# Patient Record
Sex: FEMALE | Race: WHITE | NOT HISPANIC OR LATINO | Employment: PART TIME | URBAN - METROPOLITAN AREA
[De-identification: names, ages, dates, MRNs, and addresses within clinical notes are randomized per-mention and may not be internally consistent; named-entity substitution may affect disease eponyms.]

---

## 2023-09-30 ENCOUNTER — APPOINTMENT (OUTPATIENT)
Dept: GENERAL RADIOLOGY | Facility: CLINIC | Age: 38
End: 2023-09-30
Attending: FAMILY MEDICINE

## 2023-09-30 ENCOUNTER — HOSPITAL ENCOUNTER (EMERGENCY)
Facility: CLINIC | Age: 38
Discharge: HOME | End: 2023-09-30
Attending: FAMILY MEDICINE | Admitting: FAMILY MEDICINE

## 2023-09-30 VITALS
TEMPERATURE: 98.3 F | HEIGHT: 66 IN | BODY MASS INDEX: 28.12 KG/M2 | OXYGEN SATURATION: 99 % | RESPIRATION RATE: 16 BRPM | DIASTOLIC BLOOD PRESSURE: 104 MMHG | WEIGHT: 175 LBS | SYSTOLIC BLOOD PRESSURE: 128 MMHG | HEART RATE: 91 BPM

## 2023-09-30 DIAGNOSIS — G89.29 CHRONIC PAIN IN RIGHT FOOT: ICD-10-CM

## 2023-09-30 DIAGNOSIS — M79.671 CHRONIC PAIN IN RIGHT FOOT: ICD-10-CM

## 2023-09-30 PROCEDURE — 99283 EMERGENCY DEPT VISIT LOW MDM: CPT

## 2023-09-30 PROCEDURE — 73630 X-RAY EXAM OF FOOT: CPT | Mod: RT

## 2023-09-30 PROCEDURE — 99283 EMERGENCY DEPT VISIT LOW MDM: CPT | Performed by: FAMILY MEDICINE

## 2023-09-30 RX ORDER — OXYCODONE HYDROCHLORIDE 5 MG/1
5 TABLET ORAL EVERY 6 HOURS PRN
Qty: 6 TABLET | Refills: 0 | Status: SHIPPED | OUTPATIENT
Start: 2023-09-30

## 2023-10-01 NOTE — ED TRIAGE NOTES
Comes in with right foot pain. Hx of multiple fx to right foot, and since then has had pain on and off, usually the pain is very brief, the pain started yesterday about 1500 and has not subsided.      Triage Assessment       Row Name 09/30/23 2112       Triage Assessment (Adult)    Airway WDL WDL       Respiratory WDL    Respiratory WDL WDL       Skin Circulation/Temperature WDL    Skin Circulation/Temperature WDL WDL       Cardiac WDL    Cardiac WDL WDL       Peripheral/Neurovascular WDL    Peripheral Neurovascular WDL WDL       Cognitive/Neuro/Behavioral WDL    Cognitive/Neuro/Behavioral WDL WDL

## 2023-10-01 NOTE — DISCHARGE INSTRUCTIONS
1.  Continue to use the Tylenol during the day, use the oxycodone at night for any breakthrough pain.  I would avoid wearing tennis shoes for the time being.  Please follow-up with podiatry to discuss further findings.

## 2023-10-02 NOTE — ED PROVIDER NOTES
"  History     Chief Complaint   Patient presents with    Foot Pain     HPI  Dodie Colin is a 38 year old female who presents with worsening foot pain.  Patient has chronic issues with her foot.  Patient states that it started bothering her yesterday when she was wearing some tennis shoes.  Denies any obvious trauma.  Patient has been able to walk on it but she feels like there is a bump on top of her foot that seems little worse.    Allergies:  Allergies   Allergen Reactions    Cefuroxime Anaphylaxis     12/22 per pt    Penicillins Anaphylaxis and Hives    Sulfa Antibiotics Anaphylaxis       Problem List:    There are no problems to display for this patient.       Past Medical History:    No past medical history on file.    Past Surgical History:    No past surgical history on file.    Family History:    No family history on file.    Social History:  Marital Status:  Single [1]        Medications:    oxyCODONE (ROXICODONE) 5 MG tablet          Review of Systems   All other systems reviewed and are negative.      Physical Exam   BP: (!) 128/104  Pulse: 91  Temp: 98.3  F (36.8  C)  Resp: 18  Height: 167.6 cm (5' 6\")  Weight: 79.4 kg (175 lb)  SpO2: 99 %      Physical Exam  Vitals and nursing note reviewed.   Constitutional:       General: She is not in acute distress.     Appearance: Normal appearance. She is not ill-appearing.   Musculoskeletal:         General: Tenderness (Tenderness over the midfoot area on the right foot.  No swelling or edema noted) present.   Skin:     Capillary Refill: Capillary refill takes less than 2 seconds.   Neurological:      Mental Status: She is alert.         ED Course                 Procedures        Results for orders placed or performed during the hospital encounter of 09/30/23   XR Foot Right G/E 3 Views     Status: None    Narrative    EXAM: XR FOOT RIGHT G/E 3 VIEWS  LOCATION: [facility]  DATE: 9/30/2023    INDICATION: Foot pain after " trauma.  COMPARISON: None.      Impression    IMPRESSION: Normal joint alignment. No acute fracture identified. There is a single tiny 1 mm focus of ossification adjacent to the base of the first metatarsal favored, which is favored to be a chronic finding unrelated to acute trauma; however, if   there is specific concern for midfoot fractures due to injury mechanism and pain and tenderness in this region, CT could more sensitively evaluate. Orthopedic fixation screw, is well seated without complication.         Medications - No data to display    X-ray was mostly unremarkable, there is ossification area that was shown up on x-ray, she is tender in this area, I think this is related more to her chronic foot pain and old fractures.  We will go ahead and discharge the patient at this time.  Patient was given a few pain medications, patient was told to not wear tennis shoes.  Patient was referred to podiatry.    Assessments & Plan (with Medical Decision Making)  Chronic right foot pain     I have reviewed the nursing notes.    I have reviewed the findings, diagnosis, plan and need for follow up with the patient.      Discharge Medication List as of 9/30/2023 10:16 PM        START taking these medications    Details   oxyCODONE (ROXICODONE) 5 MG tablet Take 1 tablet (5 mg) by mouth every 6 hours as needed for severe pain, Disp-6 tablet, R-0, [facility]             Final diagnoses:   Chronic pain in right foot       9/30/2023   [facility] EMERGENCY DEPT       Parvez, Freddy Max MD  10/02/23 0714

## 2024-10-28 ENCOUNTER — HOSPITAL ENCOUNTER (EMERGENCY)
Facility: CLINIC | Age: 39
Discharge: HOME | End: 2024-10-28
Attending: EMERGENCY MEDICINE | Admitting: EMERGENCY MEDICINE

## 2024-10-28 VITALS
DIASTOLIC BLOOD PRESSURE: 84 MMHG | BODY MASS INDEX: 28.93 KG/M2 | OXYGEN SATURATION: 100 % | WEIGHT: 180 LBS | SYSTOLIC BLOOD PRESSURE: 123 MMHG | TEMPERATURE: 97.4 F | HEIGHT: 66 IN | RESPIRATION RATE: 22 BRPM | HEART RATE: 82 BPM

## 2024-10-28 DIAGNOSIS — R06.02 SHORTNESS OF BREATH: ICD-10-CM

## 2024-10-28 PROCEDURE — 99283 EMERGENCY DEPT VISIT LOW MDM: CPT | Performed by: EMERGENCY MEDICINE

## 2024-10-28 PROCEDURE — 93005 ELECTROCARDIOGRAM TRACING: CPT | Performed by: EMERGENCY MEDICINE

## 2024-10-28 PROCEDURE — 99284 EMERGENCY DEPT VISIT MOD MDM: CPT | Mod: 25 | Performed by: EMERGENCY MEDICINE

## 2024-10-28 ASSESSMENT — COLUMBIA-SUICIDE SEVERITY RATING SCALE - C-SSRS
1. IN THE PAST MONTH, HAVE YOU WISHED YOU WERE DEAD OR WISHED YOU COULD GO TO SLEEP AND NOT WAKE UP?: NO
6. HAVE YOU EVER DONE ANYTHING, STARTED TO DO ANYTHING, OR PREPARED TO DO ANYTHING TO END YOUR LIFE?: NO
2. HAVE YOU ACTUALLY HAD ANY THOUGHTS OF KILLING YOURSELF IN THE PAST MONTH?: NO

## 2024-10-28 ASSESSMENT — ACTIVITIES OF DAILY LIVING (ADL): ADLS_ACUITY_SCORE: 0

## 2024-10-28 NOTE — ED NOTES
No forms was signed because nurse went back in and patient left. Tech notified nurse about the patient.

## 2024-10-28 NOTE — ED PROVIDER NOTES
"  History     Chief Complaint   Patient presents with    Shortness of Breath    Dizziness     HPI  Dodie Colin is a 39 year old female who presents to the emergency department via private vehicle secondary to shortness of breath and lightheadedness which started this morning.  She was talking on the phone this morning when she abruptly started getting short of breath.  She is having hot and cold flashes and states that she is passed out twice.  She did not fall to the floor.  She was hyperventilating on the way here and  looked over and she was passed out with her head to the side.  He checked her pulse on her neck and she had a pulse.  She does not have any chest pain.  She has a raspy voice but says that she gets that easily and that she was doing some yelling last night and that is the cause of that.  She has otherwise been well has not had any infectious symptoms such as fever chills nausea vomiting diarrhea coughing, fever, body aches.    Allergies:  Allergies   Allergen Reactions    Cefuroxime Anaphylaxis     12/22 per pt    Penicillins Anaphylaxis and Hives    Sulfa Antibiotics Anaphylaxis       Problem List:    There are no active problems to display for this patient.       Past Medical History:    No past medical history on file.    Past Surgical History:    No past surgical history on file.    Family History:    No family history on file.    Social History:  Marital Status:  Single [1]        Medications:    oxyCODONE (ROXICODONE) 5 MG tablet          Review of Systems   All other systems reviewed and are negative.      Physical Exam   BP: 123/84  Pulse: 82  Temp: 97.4  F (36.3  C)  Resp: 22  Height: 167.6 cm (5' 6\")  Weight: 81.6 kg (180 lb)  SpO2: 100 %      Physical Exam  Vitals and nursing note reviewed.   Constitutional:       General: She is not in acute distress.     Appearance: Normal appearance. She is well-developed.   HENT:      Head: Normocephalic and atraumatic.      Mouth/Throat: "      Mouth: Mucous membranes are moist.      Pharynx: No pharyngeal swelling or oropharyngeal exudate.   Eyes:      General: No scleral icterus.     Extraocular Movements: Extraocular movements intact.      Conjunctiva/sclera: Conjunctivae normal.   Cardiovascular:      Rate and Rhythm: Normal rate and regular rhythm.   Pulmonary:      Effort: Pulmonary effort is normal. Tachypnea present. No respiratory distress.      Breath sounds: Normal breath sounds. No stridor. No decreased breath sounds, wheezing, rhonchi or rales.   Abdominal:      General: Abdomen is flat.   Musculoskeletal:         General: Normal range of motion.      Cervical back: Normal range of motion and neck supple.      Right lower leg: No tenderness.      Left lower leg: No tenderness.   Skin:     General: Skin is warm and dry.      Findings: No rash.   Neurological:      General: No focal deficit present.      Mental Status: She is alert and oriented to person, place, and time.         ED Course        Procedures                        No results found for this or any previous visit (from the past 24 hours).    Medications - No data to display    Assessments & Plan (with Medical Decision Making)  39-year-old hyperventilating, complaining of shortness of breath, appears anxious.  She could not slow down her breathing when I listen to her chest.  Most likely her symptoms are related to anxiety although differential diagnosis is vast and includes pneumothorax, pneumonia, pulmonary embolism amongst other possible etiology.  I would favor anxiety over PE, MI or other ominous pathology based on her symptoms, vitals and exam.  Since this is abrupt onset while talking on the phone and the patient recently was in some sort of yelling type of argument I think this is most likely a panic attack.  I recommended a workup including VBG, CBC, basic metabolic panel, chest x-ray, EKG etc. including a D-dimer.  I also offered Ativan but patient became even more  "upset at the thought of that since she is \"afraid of medications \"and stated that she felt like she was going to pass out and she started hyperventilating more and felt worse.  I told her we can hold off on the Ativan that is okay I am know I think it would make her feel better and help her slow her breathing while we investigated other etiology for her symptoms.  Patient was in agreement with the workup but elected not to have the Ativan initially.  I ordered the blood test EKG and EKG.  I was then promptly full informed that the patient was wanting to leave AGAINST MEDICAL ADVICE.  When I went back into speak with her about it I encouraged her to stay for the full workup to rule out other ominous pathology and the patient decided she did not want to say.  She reports that the reason was that she did not have a ride home.  The man that was here with her had left.  She felt that the only way she could get home was to ride with him.  She was not sure if he was waiting for her in the parking lot or not.  She has no other ride and she has a child at home.  Despite being encouraged to stay for further workup and try to find a different ride home the patient elected to go home AGAINST MEDICAL ADVICE.  She understands the risks of adverse outcome including increased morbidity mortality.     I have reviewed the nursing notes.    I have reviewed the findings, diagnosis, plan and need for follow up with the patient.          Discharge Medication List as of 10/28/2024  5:14 PM          Final diagnoses:   Shortness of breath       10/28/2024   [facility] EMERGENCY DEPT       Suleiman Moreno MD  10/28/24 1807    "

## 2024-10-29 ENCOUNTER — APPOINTMENT (OUTPATIENT)
Dept: CT IMAGING | Facility: CLINIC | Age: 39
End: 2024-10-29
Attending: STUDENT IN AN ORGANIZED HEALTH CARE EDUCATION/TRAINING PROGRAM

## 2024-10-29 ENCOUNTER — APPOINTMENT (OUTPATIENT)
Dept: GENERAL RADIOLOGY | Facility: CLINIC | Age: 39
End: 2024-10-29
Attending: STUDENT IN AN ORGANIZED HEALTH CARE EDUCATION/TRAINING PROGRAM

## 2024-10-29 ENCOUNTER — HOSPITAL ENCOUNTER (EMERGENCY)
Facility: CLINIC | Age: 39
Discharge: HOME | End: 2024-10-29
Attending: STUDENT IN AN ORGANIZED HEALTH CARE EDUCATION/TRAINING PROGRAM | Admitting: STUDENT IN AN ORGANIZED HEALTH CARE EDUCATION/TRAINING PROGRAM

## 2024-10-29 DIAGNOSIS — R06.02 SHORTNESS OF BREATH: ICD-10-CM

## 2024-10-29 DIAGNOSIS — R40.4 TRANSIENT ALTERATION OF AWARENESS: ICD-10-CM

## 2024-10-29 LAB
ALBUMIN SERPL BCG-MCNC: 4.3 G/DL (ref 3.5–5.2)
ALP SERPL-CCNC: 82 U/L (ref 40–150)
ALT SERPL W P-5'-P-CCNC: 30 U/L (ref 0–50)
ANION GAP SERPL CALCULATED.3IONS-SCNC: 13 MMOL/L (ref 7–15)
AST SERPL W P-5'-P-CCNC: 33 U/L (ref 0–45)
BASOPHILS # BLD AUTO: 0.1 10E3/UL (ref 0–0.2)
BASOPHILS NFR BLD AUTO: 1 %
BILIRUB SERPL-MCNC: 0.3 MG/DL
BUN SERPL-MCNC: 11.4 MG/DL (ref 6–20)
CALCIUM SERPL-MCNC: 8.8 MG/DL (ref 8.8–10.4)
CHLORIDE SERPL-SCNC: 106 MMOL/L (ref 98–107)
CREAT SERPL-MCNC: 1 MG/DL (ref 0.51–0.95)
EGFRCR SERPLBLD CKD-EPI 2021: 73 ML/MIN/1.73M2
EOSINOPHIL # BLD AUTO: 0.3 10E3/UL (ref 0–0.7)
EOSINOPHIL NFR BLD AUTO: 3 %
ERYTHROCYTE [DISTWIDTH] IN BLOOD BY AUTOMATED COUNT: 14 % (ref 10–15)
GLUCOSE SERPL-MCNC: 116 MG/DL (ref 70–99)
HCO3 SERPL-SCNC: 24 MMOL/L (ref 22–29)
HCT VFR BLD AUTO: 40.1 % (ref 35–47)
HGB BLD-MCNC: 13.3 G/DL (ref 11.7–15.7)
IMM GRANULOCYTES # BLD: 0 10E3/UL
IMM GRANULOCYTES NFR BLD: 0 %
LYMPHOCYTES # BLD AUTO: 2.6 10E3/UL (ref 0.8–5.3)
LYMPHOCYTES NFR BLD AUTO: 28 %
MCH RBC QN AUTO: 28.1 PG (ref 26.5–33)
MCHC RBC AUTO-ENTMCNC: 33.2 G/DL (ref 31.5–36.5)
MCV RBC AUTO: 85 FL (ref 78–100)
MONOCYTES # BLD AUTO: 0.6 10E3/UL (ref 0–1.3)
MONOCYTES NFR BLD AUTO: 7 %
NEUTROPHILS # BLD AUTO: 5.9 10E3/UL (ref 1.6–8.3)
NEUTROPHILS NFR BLD AUTO: 62 %
NRBC # BLD AUTO: 0 10E3/UL
NRBC BLD AUTO-RTO: 0 /100
PLATELET # BLD AUTO: 387 10E3/UL (ref 150–450)
POTASSIUM SERPL-SCNC: 3.7 MMOL/L (ref 3.4–5.3)
PROT SERPL-MCNC: 7.1 G/DL (ref 6.4–8.3)
RBC # BLD AUTO: 4.74 10E6/UL (ref 3.8–5.2)
SODIUM SERPL-SCNC: 143 MMOL/L (ref 135–145)
TSH SERPL DL<=0.005 MIU/L-ACNC: 1.89 UIU/ML (ref 0.3–4.2)
WBC # BLD AUTO: 9.5 10E3/UL (ref 4–11)

## 2024-10-29 PROCEDURE — 36415 COLL VENOUS BLD VENIPUNCTURE: CPT | Performed by: STUDENT IN AN ORGANIZED HEALTH CARE EDUCATION/TRAINING PROGRAM

## 2024-10-29 PROCEDURE — 70498 CT ANGIOGRAPHY NECK: CPT

## 2024-10-29 PROCEDURE — 84075 ASSAY ALKALINE PHOSPHATASE: CPT | Performed by: STUDENT IN AN ORGANIZED HEALTH CARE EDUCATION/TRAINING PROGRAM

## 2024-10-29 PROCEDURE — 70496 CT ANGIOGRAPHY HEAD: CPT

## 2024-10-29 PROCEDURE — 99285 EMERGENCY DEPT VISIT HI MDM: CPT | Mod: 25 | Performed by: STUDENT IN AN ORGANIZED HEALTH CARE EDUCATION/TRAINING PROGRAM

## 2024-10-29 PROCEDURE — 250N000011 HC RX IP 250 OP 636: Performed by: STUDENT IN AN ORGANIZED HEALTH CARE EDUCATION/TRAINING PROGRAM

## 2024-10-29 PROCEDURE — 70450 CT HEAD/BRAIN W/O DYE: CPT

## 2024-10-29 PROCEDURE — 250N000009 HC RX 250: Performed by: STUDENT IN AN ORGANIZED HEALTH CARE EDUCATION/TRAINING PROGRAM

## 2024-10-29 PROCEDURE — 99283 EMERGENCY DEPT VISIT LOW MDM: CPT | Performed by: STUDENT IN AN ORGANIZED HEALTH CARE EDUCATION/TRAINING PROGRAM

## 2024-10-29 PROCEDURE — 85004 AUTOMATED DIFF WBC COUNT: CPT | Performed by: STUDENT IN AN ORGANIZED HEALTH CARE EDUCATION/TRAINING PROGRAM

## 2024-10-29 PROCEDURE — 71045 X-RAY EXAM CHEST 1 VIEW: CPT

## 2024-10-29 PROCEDURE — 84443 ASSAY THYROID STIM HORMONE: CPT | Performed by: STUDENT IN AN ORGANIZED HEALTH CARE EDUCATION/TRAINING PROGRAM

## 2024-10-29 RX ORDER — IOPAMIDOL 755 MG/ML
500 INJECTION, SOLUTION INTRAVASCULAR ONCE
Status: COMPLETED | OUTPATIENT
Start: 2024-10-29 | End: 2024-10-29

## 2024-10-29 RX ORDER — ALBUTEROL SULFATE 90 UG/1
2 INHALANT RESPIRATORY (INHALATION) EVERY 6 HOURS PRN
Qty: 18 G | Refills: 0 | Status: SHIPPED | OUTPATIENT
Start: 2024-10-29

## 2024-10-29 RX ORDER — HYDROXYZINE HYDROCHLORIDE 25 MG/1
25 TABLET, FILM COATED ORAL 3 TIMES DAILY PRN
Qty: 30 TABLET | Refills: 0 | Status: SHIPPED | OUTPATIENT
Start: 2024-10-29

## 2024-10-29 RX ADMIN — SODIUM CHLORIDE 100 ML: 9 INJECTION, SOLUTION INTRAVENOUS at 21:47

## 2024-10-29 RX ADMIN — IOPAMIDOL 67 ML: 755 INJECTION, SOLUTION INTRAVENOUS at 21:47

## 2024-10-29 ASSESSMENT — ACTIVITIES OF DAILY LIVING (ADL)
ADLS_ACUITY_SCORE: 0
ADLS_ACUITY_SCORE: 0

## 2024-10-29 ASSESSMENT — ENCOUNTER SYMPTOMS: CHEST TIGHTNESS: 1

## 2024-10-30 NOTE — ED PROVIDER NOTES
History     Chief Complaint   Patient presents with    Confusion     HPI  Dodie Colin is a 39 year old female who presenting for concerns of confusion and shortness of breath.  She was here last night and brought in by EMS after having a syncopal episode and coming to while she was in the EMS and not remembering the exact reason why she was there.  On arrival she explains that nobody told her why she was there and she did understand the reasoning for her being in the ER therefore she left.  She comes back this evening again secondary to her dad Nilda having her evaluated secondary to her memory lapses.  She notes that her breathing is not concerning at this point.  She has a family history of aneurysms and wants to be evaluate for this as well as while she otherwise had her symptoms.  She denies any suicidal behavior or homicidal behavior.  She denies any  drug history or alcohol dependence.  Her partner is at bedside.  She is otherwise acting normally pertaining to him.    Allergies:  Allergies   Allergen Reactions    Cefuroxime Anaphylaxis     12/22 per pt    Penicillins Anaphylaxis and Hives    Sulfa Antibiotics Anaphylaxis       Problem List:    There are no active problems to display for this patient.       Past Medical History:    History reviewed. No pertinent past medical history.    Past Surgical History:    History reviewed. No pertinent surgical history.    Family History:    History reviewed. No pertinent family history.    Social History:  Marital Status:  Single [1]  Social History     Tobacco Use    Smoking status: Every Day     Types: Cigarettes    Smokeless tobacco: Never   Substance Use Topics    Alcohol use: Not Currently    Drug use: Never        Medications:    albuterol (PROAIR HFA/PROVENTIL HFA/VENTOLIN HFA) 108 (90 Base) MCG/ACT inhaler  hydrOXYzine HCl (ATARAX) 25 MG tablet  oxyCODONE (ROXICODONE) 5 MG tablet          Review of Systems   Respiratory:  Positive for chest tightness.     Neurological:         Transient memory impairment   All other systems reviewed and are negative.      Physical Exam          Physical Exam  Vitals and nursing note reviewed.   Constitutional:       General: She is not in acute distress.     Appearance: Normal appearance. She is not diaphoretic.   HENT:      Head: Normocephalic and atraumatic.      Mouth/Throat:      Mouth: Mucous membranes are moist.   Eyes:      General: No scleral icterus.     Conjunctiva/sclera: Conjunctivae normal.   Cardiovascular:      Rate and Rhythm: Normal rate and regular rhythm.      Pulses: Normal pulses.      Heart sounds: Normal heart sounds.   Pulmonary:      Effort: Pulmonary effort is normal. No respiratory distress.      Breath sounds: Normal breath sounds.   Abdominal:      General: Abdomen is flat. Bowel sounds are normal. There is no distension.      Palpations: Abdomen is soft.      Tenderness: There is no abdominal tenderness. There is no guarding.   Musculoskeletal:      Cervical back: Neck supple.   Skin:     General: Skin is warm.      Findings: No rash.   Neurological:      General: No focal deficit present.      Mental Status: She is alert and oriented to person, place, and time. Mental status is at baseline.      Cranial Nerves: No cranial nerve deficit.      Sensory: No sensory deficit.      Motor: No weakness.      Coordination: Coordination normal.      Gait: Gait normal.         ED Course        Procedures             Results for orders placed or performed during the hospital encounter of 10/29/24 (from the past 24 hours)   CBC with platelets differential    Narrative    The following orders were created for panel order CBC with platelets differential.  Procedure                               Abnormality         Status                     ---------                               -----------         ------                     CBC with platelets and d...[[id number]]                      Final result                  Please view results for these tests on the individual orders.   Comprehensive metabolic panel   Result Value Ref Range    Sodium 143 135 - 145 mmol/L    Potassium 3.7 3.4 - 5.3 mmol/L    Carbon Dioxide (CO2) 24 22 - 29 mmol/L    Anion Gap 13 7 - 15 mmol/L    Urea Nitrogen 11.4 6.0 - 20.0 mg/dL    Creatinine 1.00 (H) 0.51 - 0.95 mg/dL    GFR Estimate 73 >60 mL/min/1.73m2    Calcium 8.8 8.8 - 10.4 mg/dL    Chloride 106 98 - 107 mmol/L    Glucose 116 (H) 70 - 99 mg/dL    Alkaline Phosphatase 82 40 - 150 U/L    AST 33 0 - 45 U/L    ALT 30 0 - 50 U/L    Protein Total 7.1 6.4 - 8.3 g/dL    Albumin 4.3 3.5 - 5.2 g/dL    Bilirubin Total 0.3 <=1.2 mg/dL   TSH with free T4 reflex   Result Value Ref Range    TSH 1.89 0.30 - 4.20 uIU/mL   CBC with platelets and differential   Result Value Ref Range    WBC Count 9.5 4.0 - 11.0 10e3/uL    RBC Count 4.74 3.80 - 5.20 10e6/uL    Hemoglobin 13.3 11.7 - 15.7 g/dL    Hematocrit 40.1 35.0 - 47.0 %    MCV 85 78 - 100 fL    MCH 28.1 26.5 - 33.0 pg    MCHC 33.2 31.5 - 36.5 g/dL    RDW 14.0 10.0 - 15.0 %    Platelet Count 387 150 - 450 10e3/uL    % Neutrophils 62 %    % Lymphocytes 28 %    % Monocytes 7 %    % Eosinophils 3 %    % Basophils 1 %    % Immature Granulocytes 0 %    NRBCs per 100 WBC 0 <1 /100    Absolute Neutrophils 5.9 1.6 - 8.3 10e3/uL    Absolute Lymphocytes 2.6 0.8 - 5.3 10e3/uL    Absolute Monocytes 0.6 0.0 - 1.3 10e3/uL    Absolute Eosinophils 0.3 0.0 - 0.7 10e3/uL    Absolute Basophils 0.1 0.0 - 0.2 10e3/uL    Absolute Immature Granulocytes 0.0 <=0.4 10e3/uL    Absolute NRBCs 0.0 10e3/uL   XR Chest Port 1 View    Narrative    EXAM: XR CHEST PORT 1 VIEW  LOCATION: [facility]  DATE: 10/29/2024    INDICATION: chest pain   sob  COMPARISON: None.      Impression    IMPRESSION: Negative chest.   Head CT w/o contrast    Narrative    EXAM: CT HEAD W/O CONTRAST, CTA HEAD NECK W CONTRAST  LOCATION: [facility]  DATE: 10/29/2024    INDICATION: syncope   confusion  COMPARISON: None.  CONTRAST: Isovue 370, 67mL  TECHNIQUE: Head and neck CT angiogram with IV contrast. Noncontrast head CT followed by axial helical CT images of the head and neck vessels obtained during the arterial phase of intravenous contrast administration. Axial 2D reconstructed images and   multiplanar 3D MIP reconstructed images of the head and neck vessels were performed by the technologist. Dose reduction techniques were used. All stenosis measurements made according to NASCET criteria unless otherwise specified.    FINDINGS:   NONCONTRAST HEAD CT:   INTRACRANIAL CONTENTS: No intracranial hemorrhage, extraaxial collection, or mass effect.  No CT evidence of acute infarct. Normal parenchymal attenuation. Normal ventricles and sulci.     VISUALIZED ORBITS/SINUSES/MASTOIDS: No intraorbital abnormality. No paranasal sinus mucosal disease. No middle ear or mastoid effusion.    BONES/SOFT TISSUES: No acute abnormality.    HEAD CTA:  ANTERIOR CIRCULATION: No stenosis/occlusion, aneurysm, or high flow vascular malformation. Standard [place] anatomy.    POSTERIOR CIRCULATION: No stenosis/occlusion, aneurysm, or high flow vascular malformation. Dominant left and smaller right vertebral artery contribute to a normal basilar artery.     DURAL VENOUS SINUSES: Not well evaluated on a technical basis.    NECK CTA:  RIGHT CAROTID: No measurable stenosis or dissection.    LEFT CAROTID: No measurable stenosis or dissection.    VERTEBRAL ARTERIES: No focal stenosis or dissection. Dominant left and smaller right vertebral arteries.    AORTIC ARCH: Classic aortic arch anatomy with no significant stenosis at the origin of the great vessels.    NONVASCULAR STRUCTURES: Unremarkable.      Impression    IMPRESSION:   HEAD CT:  No acute intracranial process.    HEAD CTA:  No stenosis/occlusion, aneurysm, or high flow vascular malformation.    NECK CTA:  No  measurable stenosis or dissection.     CTA Head Neck with Contrast    Narrative    EXAM: CT HEAD W/O CONTRAST, CTA HEAD NECK W CONTRAST  LOCATION: [facility]  DATE: 10/29/2024    INDICATION: syncope   confusion  COMPARISON: None.  CONTRAST: Isovue 370, 67mL  TECHNIQUE: Head and neck CT angiogram with IV contrast. Noncontrast head CT followed by axial helical CT images of the head and neck vessels obtained during the arterial phase of intravenous contrast administration. Axial 2D reconstructed images and   multiplanar 3D MIP reconstructed images of the head and neck vessels were performed by the technologist. Dose reduction techniques were used. All stenosis measurements made according to NASCET criteria unless otherwise specified.    FINDINGS:   NONCONTRAST HEAD CT:   INTRACRANIAL CONTENTS: No intracranial hemorrhage, extraaxial collection, or mass effect.  No CT evidence of acute infarct. Normal parenchymal attenuation. Normal ventricles and sulci.     VISUALIZED ORBITS/SINUSES/MASTOIDS: No intraorbital abnormality. No paranasal sinus mucosal disease. No middle ear or mastoid effusion.    BONES/SOFT TISSUES: No acute abnormality.    HEAD CTA:  ANTERIOR CIRCULATION: No stenosis/occlusion, aneurysm, or high flow vascular malformation. Standard [place] anatomy.    POSTERIOR CIRCULATION: No stenosis/occlusion, aneurysm, or high flow vascular malformation. Dominant left and smaller right vertebral artery contribute to a normal basilar artery.     DURAL VENOUS SINUSES: Not well evaluated on a technical basis.    NECK CTA:  RIGHT CAROTID: No measurable stenosis or dissection.    LEFT CAROTID: No measurable stenosis or dissection.    VERTEBRAL ARTERIES: No focal stenosis or dissection. Dominant left and smaller right vertebral arteries.    AORTIC ARCH: Classic aortic arch anatomy with no significant stenosis at the origin of the great vessels.    NONVASCULAR STRUCTURES:  Unremarkable.      Impression    IMPRESSION:   HEAD CT:  No acute intracranial process.    HEAD CTA:  No stenosis/occlusion, aneurysm, or high flow vascular malformation.    NECK CTA:  No measurable stenosis or dissection.         Medications   CT Saline Flush 100mL (100 mLs As instructed $Given 10/29/24 2147)   iopamidol (ISOVUE-370) solution 500 mL (67 mLs Intravenous $Given 10/29/24 2147)       Assessments & Plan (with Medical Decision Making)     I have reviewed the nursing notes.    I have reviewed the findings, diagnosis, plan and need for follow up with the patient.      Medical Decision Making    39-year-old female presenting with what was concerning for altered mental status by her dad after what sound like a possible panic attack at home with associated possible syncope.  On arrival yesterday patient noted that she just does not know why she was in the emergency department and left without being seen.  She presented again today with concerns from her dad due to the episodes of panic attacks with associated syncope and some memory loss.  She denies any drug abuse history of alcohol dependence.  She denies hitting her head.  She notes that she has had intermittent troubles with shortness of breath.  Her partner is at bedside noting that her mental status is been the same as it normally is.  Does not believe that she is any acute harm to herself or others.  Denies depression and anxiety at this time.  She is concerned however due to family history of aneurysms that this could be present.  We discussed evaluating some basic lab work and doing some basic imaging to evaluate her shortness of breath and syncopal episodes could be various pathology such as strokes and/or panic attack or even ischemia of the heart.  Patient has no chest pains or noted history of cardiac pathology.  Her CT imaging of her head and neck was unremarkable.  Her chest x-ray was unremarkable and her lab work was also reassuring.  No other  acute concerns was present and patient wanted to leave.  We discussed doing EKG and patient noted that she would like to leave at this point she would like to go down and have a smoke.  We discussed outpatient follow-up measures and return precautions.  Patient discharged home    Discharge Medication List as of 10/29/2024 10:40 PM        START taking these medications    Details   albuterol (PROAIR HFA/PROVENTIL HFA/VENTOLIN HFA) 108 (90 Base) MCG/ACT inhaler Inhale 2 puffs into the lungs every 6 hours as needed for shortness of breath, wheezing or cough., Disp-18 g, R-0, E-PrescribePharmacy may dispense brand covered by insurance (Proair, or proventil or ventolin or generic albuterol inhaler)      hydrOXYzine HCl (ATARAX) 25 MG tablet Take 1 tablet (25 mg) by mouth 3 times daily as needed., Disp-30 tablet, R-0, E-Prescribe             Final diagnoses:   Shortness of breath   Transient alteration of awareness       10/29/2024   [facility] EMERGENCY DEPT       Jayant Caro MD  10/29/24 5105

## 2024-10-30 NOTE — DISCHARGE INSTRUCTIONS
You are seen today for some concerns about confusion as well as some shortness of breath and possible anxiety.    Your lab work was reassuring.  We have not received your head CT back yet but if anything is abnormal we will call you in regards to this before midnight.  If you do not hear back from me all results were otherwise unremarkable.    Please try to find a primary care doctor.    Prescriptions were sent to your pharmacy 1 is albuterol to help with any breathing shortness of breath he may be having.  Use as prescribed.    Otherwise you are also sent home with hydroxyzine which is a medication that can help with anxiety that can be used every 6 hours as needed.  Please feel free to use this if you feel anxious or feel like you are having symptoms consistent with the symptoms she had this evening.    If symptoms worsen please return for reevaluation.

## 2024-10-30 NOTE — ED TRIAGE NOTES
"Pt came in by ambulance and was not on a transport hold and left without being seen. Pt has been short of breath and having syncopal episodes. \"Passed out\" in ambulance and wasn't sure why she was in ED. Pt dad is also noting she needs a mental health evaluation.      Triage Assessment (Adult)       Row Name 10/29/24 2011          Triage Assessment    Airway WDL WDL        Respiratory WDL    Respiratory WDL WDL        Cardiac WDL    Cardiac WDL WDL                     "